# Patient Record
Sex: MALE | Race: ASIAN
[De-identification: names, ages, dates, MRNs, and addresses within clinical notes are randomized per-mention and may not be internally consistent; named-entity substitution may affect disease eponyms.]

---

## 2017-10-04 PROBLEM — Z00.00 ENCOUNTER FOR PREVENTIVE HEALTH EXAMINATION: Status: ACTIVE | Noted: 2017-10-04

## 2017-10-12 ENCOUNTER — APPOINTMENT (OUTPATIENT)
Dept: OPHTHALMOLOGY | Facility: CLINIC | Age: 27
End: 2017-10-12
Payer: COMMERCIAL

## 2017-10-12 PROCEDURE — 92133 CPTRZD OPH DX IMG PST SGM ON: CPT

## 2017-10-12 PROCEDURE — 92014 COMPRE OPH EXAM EST PT 1/>: CPT

## 2017-10-12 PROCEDURE — 92083 EXTENDED VISUAL FIELD XM: CPT

## 2018-03-24 ENCOUNTER — EMERGENCY (EMERGENCY)
Facility: HOSPITAL | Age: 28
LOS: 1 days | Discharge: HOME | End: 2018-03-24
Attending: EMERGENCY MEDICINE | Admitting: EMERGENCY MEDICINE
Payer: COMMERCIAL

## 2018-03-24 VITALS
RESPIRATION RATE: 16 BRPM | OXYGEN SATURATION: 100 % | TEMPERATURE: 99 F | HEIGHT: 65 IN | SYSTOLIC BLOOD PRESSURE: 153 MMHG | HEART RATE: 90 BPM | DIASTOLIC BLOOD PRESSURE: 94 MMHG | WEIGHT: 147.93 LBS

## 2018-03-24 LAB
ALBUMIN SERPL ELPH-MCNC: 4.8 G/DL — SIGNIFICANT CHANGE UP (ref 3.3–5)
ALP SERPL-CCNC: 74 U/L — SIGNIFICANT CHANGE UP (ref 40–120)
ALT FLD-CCNC: 14 U/L — SIGNIFICANT CHANGE UP (ref 4–41)
APPEARANCE UR: CLEAR — SIGNIFICANT CHANGE UP
APTT BLD: 31.9 SEC — SIGNIFICANT CHANGE UP (ref 27.5–37.4)
AST SERPL-CCNC: 14 U/L — SIGNIFICANT CHANGE UP (ref 4–40)
BASE EXCESS BLDV CALC-SCNC: 2.4 MMOL/L — SIGNIFICANT CHANGE UP
BASOPHILS # BLD AUTO: 0.02 K/UL — SIGNIFICANT CHANGE UP (ref 0–0.2)
BASOPHILS NFR BLD AUTO: 0.2 % — SIGNIFICANT CHANGE UP (ref 0–2)
BILIRUB SERPL-MCNC: 1.5 MG/DL — HIGH (ref 0.2–1.2)
BILIRUB UR-MCNC: NEGATIVE — SIGNIFICANT CHANGE UP
BLOOD GAS VENOUS - CREATININE: 1.91 MG/DL — HIGH (ref 0.5–1.3)
BLOOD UR QL VISUAL: NEGATIVE — SIGNIFICANT CHANGE UP
BUN SERPL-MCNC: 15 MG/DL — SIGNIFICANT CHANGE UP (ref 7–23)
CALCIUM SERPL-MCNC: 9.5 MG/DL — SIGNIFICANT CHANGE UP (ref 8.4–10.5)
CHLORIDE BLDV-SCNC: 106 MMOL/L — SIGNIFICANT CHANGE UP (ref 96–108)
CHLORIDE SERPL-SCNC: 101 MMOL/L — SIGNIFICANT CHANGE UP (ref 98–107)
CO2 SERPL-SCNC: 26 MMOL/L — SIGNIFICANT CHANGE UP (ref 22–31)
COLOR SPEC: SIGNIFICANT CHANGE UP
CREAT SERPL-MCNC: 1.96 MG/DL — HIGH (ref 0.5–1.3)
EOSINOPHIL # BLD AUTO: 0.04 K/UL — SIGNIFICANT CHANGE UP (ref 0–0.5)
EOSINOPHIL NFR BLD AUTO: 0.3 % — SIGNIFICANT CHANGE UP (ref 0–6)
GAS PNL BLDV: 139 MMOL/L — SIGNIFICANT CHANGE UP (ref 136–146)
GLUCOSE BLDV-MCNC: 110 — HIGH (ref 70–99)
GLUCOSE SERPL-MCNC: 109 MG/DL — HIGH (ref 70–99)
GLUCOSE UR-MCNC: NEGATIVE — SIGNIFICANT CHANGE UP
HCO3 BLDV-SCNC: 25 MMOL/L — SIGNIFICANT CHANGE UP (ref 20–27)
HCT VFR BLD CALC: 44.5 % — SIGNIFICANT CHANGE UP (ref 39–50)
HCT VFR BLDV CALC: 49.7 % — SIGNIFICANT CHANGE UP (ref 39–51)
HGB BLD-MCNC: 15.3 G/DL — SIGNIFICANT CHANGE UP (ref 13–17)
HGB BLDV-MCNC: 16.2 G/DL — SIGNIFICANT CHANGE UP (ref 13–17)
IMM GRANULOCYTES # BLD AUTO: 0.03 # — SIGNIFICANT CHANGE UP
IMM GRANULOCYTES NFR BLD AUTO: 0.3 % — SIGNIFICANT CHANGE UP (ref 0–1.5)
INR BLD: 1.08 — SIGNIFICANT CHANGE UP (ref 0.88–1.17)
KETONES UR-MCNC: NEGATIVE — SIGNIFICANT CHANGE UP
LACTATE BLDV-MCNC: 1 MMOL/L — SIGNIFICANT CHANGE UP (ref 0.5–2)
LEUKOCYTE ESTERASE UR-ACNC: NEGATIVE — SIGNIFICANT CHANGE UP
LIDOCAIN IGE QN: 33.7 U/L — SIGNIFICANT CHANGE UP (ref 7–60)
LYMPHOCYTES # BLD AUTO: 2.7 K/UL — SIGNIFICANT CHANGE UP (ref 1–3.3)
LYMPHOCYTES # BLD AUTO: 22.7 % — SIGNIFICANT CHANGE UP (ref 13–44)
MCHC RBC-ENTMCNC: 27.6 PG — SIGNIFICANT CHANGE UP (ref 27–34)
MCHC RBC-ENTMCNC: 34.4 % — SIGNIFICANT CHANGE UP (ref 32–36)
MCV RBC AUTO: 80.2 FL — SIGNIFICANT CHANGE UP (ref 80–100)
MONOCYTES # BLD AUTO: 0.81 K/UL — SIGNIFICANT CHANGE UP (ref 0–0.9)
MONOCYTES NFR BLD AUTO: 6.8 % — SIGNIFICANT CHANGE UP (ref 2–14)
NEUTROPHILS # BLD AUTO: 8.3 K/UL — HIGH (ref 1.8–7.4)
NEUTROPHILS NFR BLD AUTO: 69.7 % — SIGNIFICANT CHANGE UP (ref 43–77)
NITRITE UR-MCNC: NEGATIVE — SIGNIFICANT CHANGE UP
NRBC # FLD: 0 — SIGNIFICANT CHANGE UP
PCO2 BLDV: 46 MMHG — SIGNIFICANT CHANGE UP (ref 41–51)
PH BLDV: 7.39 PH — SIGNIFICANT CHANGE UP (ref 7.32–7.43)
PH UR: 6.5 — SIGNIFICANT CHANGE UP (ref 4.6–8)
PLATELET # BLD AUTO: 198 K/UL — SIGNIFICANT CHANGE UP (ref 150–400)
PMV BLD: 11.4 FL — SIGNIFICANT CHANGE UP (ref 7–13)
PO2 BLDV: 32 MMHG — LOW (ref 35–40)
POTASSIUM BLDV-SCNC: 3.8 MMOL/L — SIGNIFICANT CHANGE UP (ref 3.4–4.5)
POTASSIUM SERPL-MCNC: 3.9 MMOL/L — SIGNIFICANT CHANGE UP (ref 3.5–5.3)
POTASSIUM SERPL-SCNC: 3.9 MMOL/L — SIGNIFICANT CHANGE UP (ref 3.5–5.3)
PROT SERPL-MCNC: 8.1 G/DL — SIGNIFICANT CHANGE UP (ref 6–8.3)
PROT UR-MCNC: NEGATIVE MG/DL — SIGNIFICANT CHANGE UP
PROTHROM AB SERPL-ACNC: 12 SEC — SIGNIFICANT CHANGE UP (ref 9.8–13.1)
RBC # BLD: 5.55 M/UL — SIGNIFICANT CHANGE UP (ref 4.2–5.8)
RBC # FLD: 12.9 % — SIGNIFICANT CHANGE UP (ref 10.3–14.5)
RBC CASTS # UR COMP ASSIST: SIGNIFICANT CHANGE UP (ref 0–?)
SAO2 % BLDV: 59.3 % — LOW (ref 60–85)
SODIUM SERPL-SCNC: 142 MMOL/L — SIGNIFICANT CHANGE UP (ref 135–145)
SP GR SPEC: 1.01 — SIGNIFICANT CHANGE UP (ref 1–1.04)
UROBILINOGEN FLD QL: NORMAL MG/DL — SIGNIFICANT CHANGE UP
WBC # BLD: 11.9 K/UL — HIGH (ref 3.8–10.5)
WBC # FLD AUTO: 11.9 K/UL — HIGH (ref 3.8–10.5)
WBC UR QL: SIGNIFICANT CHANGE UP (ref 0–?)

## 2018-03-24 PROCEDURE — 74176 CT ABD & PELVIS W/O CONTRAST: CPT | Mod: 26

## 2018-03-24 PROCEDURE — 76705 ECHO EXAM OF ABDOMEN: CPT | Mod: 26

## 2018-03-24 PROCEDURE — 99284 EMERGENCY DEPT VISIT MOD MDM: CPT

## 2018-03-24 RX ORDER — SODIUM CHLORIDE 9 MG/ML
1000 INJECTION INTRAMUSCULAR; INTRAVENOUS; SUBCUTANEOUS ONCE
Qty: 0 | Refills: 0 | Status: COMPLETED | OUTPATIENT
Start: 2018-03-24 | End: 2018-03-24

## 2018-03-24 RX ORDER — ACETAMINOPHEN 500 MG
1000 TABLET ORAL ONCE
Qty: 0 | Refills: 0 | Status: COMPLETED | OUTPATIENT
Start: 2018-03-24 | End: 2018-03-24

## 2018-03-24 RX ADMIN — SODIUM CHLORIDE 1000 MILLILITER(S): 9 INJECTION INTRAMUSCULAR; INTRAVENOUS; SUBCUTANEOUS at 09:30

## 2018-03-24 RX ADMIN — Medication 1000 MILLIGRAM(S): at 10:42

## 2018-03-24 RX ADMIN — Medication 400 MILLIGRAM(S): at 09:30

## 2018-03-24 NOTE — ED PROVIDER NOTE - OBJECTIVE STATEMENT
28 y/o M w/ no significant PMHx presenting to ED w/ abdominal pain x3days. Pt states over past x3days having lower abd pain. Went to Urgent care where he had an XR noted to have a lot of stool. Given Cipril, Senakot, and acid medication. Pt states he took that twice to no relief and stopped taking it. States pain since has been mostly in RLQ/LLQ. Denies vomiting, sob, nausea, cp.

## 2018-03-24 NOTE — ED ADULT NURSE NOTE - OBJECTIVE STATEMENT
Patient c/o abdominal pain across the abdomen for the past 3 days, which has progressively worsened. Denies N/V/D, burning on urination.

## 2018-03-24 NOTE — ED PROVIDER NOTE - PROGRESS NOTE DETAILS
Pt states feeling better. Tolerating PO. Discussed elevated T. bili and Cr with pt and advised to f/u with PMD. Advised to continue medications for constipation that includes Miralax and Senocot. Advised Magnesium Citrate. Given GI f/u.

## 2018-03-24 NOTE — ED PROVIDER NOTE - MEDICAL DECISION MAKING DETAILS
28 y/o M w/ lower abdominal pain, likely constipation, r/o appendicitis. Plan - labs, IV fluids, CT, analgesia.

## 2018-03-27 ENCOUNTER — APPOINTMENT (OUTPATIENT)
Dept: GASTROENTEROLOGY | Facility: CLINIC | Age: 28
End: 2018-03-27
Payer: COMMERCIAL

## 2018-03-27 VITALS
SYSTOLIC BLOOD PRESSURE: 126 MMHG | HEIGHT: 65 IN | BODY MASS INDEX: 24.16 KG/M2 | WEIGHT: 145 LBS | RESPIRATION RATE: 17 BRPM | HEART RATE: 79 BPM | DIASTOLIC BLOOD PRESSURE: 76 MMHG | TEMPERATURE: 97.7 F | OXYGEN SATURATION: 98 %

## 2018-03-27 DIAGNOSIS — K58.9 IRRITABLE BOWEL SYNDROME W/OUT DIARRHEA: ICD-10-CM

## 2018-03-27 DIAGNOSIS — F15.90 OTHER STIMULANT USE, UNSPECIFIED, UNCOMPLICATED: ICD-10-CM

## 2018-03-27 DIAGNOSIS — R10.9 UNSPECIFIED ABDOMINAL PAIN: ICD-10-CM

## 2018-03-27 DIAGNOSIS — K59.00 CONSTIPATION, UNSPECIFIED: ICD-10-CM

## 2018-03-27 PROCEDURE — 99244 OFF/OP CNSLTJ NEW/EST MOD 40: CPT

## 2018-03-27 RX ORDER — BIFIDOBACTERIUM LONGUM 10MM CELL
4 CAPSULE ORAL
Qty: 30 | Refills: 3 | Status: ACTIVE | OUTPATIENT
Start: 2018-03-27

## 2018-03-27 RX ORDER — PSYLLIUM SEED (WITH DEXTROSE)
28 POWDER (GRAM) ORAL
Qty: 30 | Refills: 0 | Status: ACTIVE | COMMUNITY
Start: 2018-03-27 | End: 1900-01-01

## 2018-03-27 RX ORDER — STANDARDIZED SENNA CONCENTRATE 8.6 MG/1
8.6 TABLET ORAL
Refills: 0 | Status: ACTIVE | COMMUNITY

## 2018-03-27 RX ORDER — CIPROFLOXACIN HYDROCHLORIDE 500 MG/1
500 TABLET, FILM COATED ORAL
Qty: 14 | Refills: 0 | Status: COMPLETED | COMMUNITY
Start: 2018-03-22 | End: 2018-03-27

## 2018-03-27 RX ORDER — RANITIDINE 150 MG/1
150 TABLET ORAL
Qty: 60 | Refills: 0 | Status: COMPLETED | COMMUNITY
Start: 2018-03-22 | End: 2018-03-27

## 2019-03-25 NOTE — ED ADULT TRIAGE NOTE - NS ED NURSE BANDS TYPE
Detail Level: Detailed
Quality 224: Stage 0-Iic Melanoma: Overutilization Of Imaging Studies For Only Stage 0-Iic Melanoma: None of the following diagnostic imaging studies ordered: chest X-ray, CT, Ultrasound, MRI, PET, or nuclear medicine scans (ML)
Quality 138: Melanoma: Coordination Of Care: A treatment plan was communicated to the physicians providing continuing care within one month of diagnosis outlining: diagnosis, tumor thickness and a plan for surgery or alternate care.
Name band;
Quality 130: Documentation Of Current Medications In The Medical Record: Current Medications Documented
Quality 265: Biopsy Follow-Up: Biopsy results reviewed, communicated, tracked, and documented
Quality 137: Melanoma: Continuity Of Care - Recall System: Patient information entered into a recall system that includes: target date for the next exam specified AND a process to follow up with patients regarding missed or unscheduled appointments
Quality 397: Melanoma: Reporting: The pathology report includes a pT Category and statement on thickness and ulceration for pT1, mitotic rate.

## 2021-11-22 ENCOUNTER — APPOINTMENT (OUTPATIENT)
Dept: OPHTHALMOLOGY | Facility: CLINIC | Age: 31
End: 2021-11-22

## 2022-03-10 ENCOUNTER — APPOINTMENT (OUTPATIENT)
Dept: OTOLARYNGOLOGY | Facility: CLINIC | Age: 32
End: 2022-03-10

## 2022-10-24 ENCOUNTER — APPOINTMENT (OUTPATIENT)
Dept: NEUROLOGY | Facility: CLINIC | Age: 32
End: 2022-10-24

## 2023-02-28 NOTE — ED PROVIDER NOTE - CHPI ED SYMPTOMS NEG
[FreeTextEntry1] : Impression\par \par Request for colon cancer screening\par \par Suggest\par \par Agree with colonoscopy\par \par Suprep\par \par The laxative, or its risks benefits and alternatives have been thoroughly reviewed with the patient in great detail. The laxative instructions have been reviewed in great detail with the patient.\par \par Risks/benefits:\par The procedure, the risks and benefits and alternatives have been reviewed in great detail with the patient.  Risks including, but not limited to sedation such as cardiac and pulmonary compromise, the procedure itself such as bleeding requiring hospitalization, transfusion, surgery, temporary or permanent colostomy.  Perforation or puncture of the requiring hospitalization, surgery, temporary colostomy.\par It has been explained to the patient that though colonoscopy is thought to be the best screening exam for colon cancer and polyps, no screening exam can find all colon polyps or cancers.  \par The patient expresses understanding of the procedure and consents to undergoing the procedure.\par  no cp, no sob, no other complaints/no vomiting/no nausea

## 2023-12-07 ENCOUNTER — NON-APPOINTMENT (OUTPATIENT)
Age: 33
End: 2023-12-07

## 2024-09-25 ENCOUNTER — NON-APPOINTMENT (OUTPATIENT)
Age: 34
End: 2024-09-25

## 2024-09-27 ENCOUNTER — TRANSCRIPTION ENCOUNTER (OUTPATIENT)
Age: 34
End: 2024-09-27